# Patient Record
(demographics unavailable — no encounter records)

---

## 2025-03-06 NOTE — PHYSICAL EXAM
[Normal] : alert, oriented as age-appropriate, affect appropriate; no weakness, no facial asymmetry, moves all extremities normal gait-child older than 18 months [de-identified] : Well appearing. Active. walking about the room [de-identified] : Left leg - ankle area: mild swelling over dorsum and lateral malleolus. Point tenderness over distal end of fibula. FROM of active and passive movement of left joint. Gait: No limp. Able to walk on tippy toe. Some difficulty with heel walking.

## 2025-03-06 NOTE — HISTORY OF PRESENT ILLNESS
[FreeTextEntry2] : Hospital Course:  Discharge Date	01-Mar-2025  Admission Date	25-Feb-2025 22:43  Reason for Admission	r/o osteomyelitis  Hospital Course	  6yo M with no pmhx presenting with left ankle pain and swelling t/f from [facility] after fall 2 days ago. He reports Sun 2/23 night he fell out of bed (this happened in the past), did not tell parents until the morning. He woke up the following morning (Mon 2/24) limping saying "I broke my leg" but was able to bear some weight on it. He went to school and was able to walk home from the bus stop but continued to limp. Then this morning 2/25, he could not bear any weight and had decreased appetite, prompting visit to urgent care. At , he had fever 102F and erythema of the lower leg and were directed to go to [facility]. Denies rash, vomiting, diarrhea, headache, other joint pain. No recent travel or sick contacts. Parents did not check temp at home, he did not c/o feeling warm or chills prior to urgent care. Last PO 1130pm 2/25. No prior fractures or msk injury.   [facility]: WBC 20 w/ neutrophil pred. procal 0.19. CRP pending. X-ray negative Tylenol/Motrin, IV CTX, 1xNS bolus  ED: ortho said MRI and low concern septic jt, U/S prelim no fluid collection   Floor course (2/26 - 3/1):  Arrived to floor stable on mIVF and RA. MR c/f osteomyelitis with associated abscess vs underlying body neoplasm vs reactive vs septic arthritis. Found to have MSSA bacteremia on 2/27 from [facility] BCx. Started on Ancef on 2/27-3/1. IR drainage occurred on 2/27. BCx from 2/27 prior to antibiotics showed no growth at 24H. Patient does not need repeat negative blood culture. OR abscess culture collected 2/27 showed gram positive cocci in clusters, indicative of Staph. Joint effusion culture shows no growth to date. CRP downtrending. Patient is clinically stable, able to bear weight on leg, ambulating and afebrile prior to discharge on 3/1. He will need repeat MRI in 4-6 weeks.   On day of discharge, VS reviewed and remained wnl. Child continued to tolerate PO with adequate UOP. Child remained well-appearing, with no concerning findings noted on physical exam. Case and care plan d/w PMD. No additional recommendations noted. Care plan d/w caregivers who endorsed understanding. Anticipatory guidance and strict return precautions d/w caregivers in great detail. Child deemed stable for d/c home w/ recommended PMD f/u in 1-2 days of discharge.     Discharge Physical Exam:   Ext: Improved ROM of foot/ankle, less erythema and now receding from marked boarders, post op drainage site c/d/i, still with some tenderness but improved; all other joints full ROM   Discharge Medications	cephalexin 250 mg/5 mL oral liquid: 20 milliliter(s) orally every 8 hours     INTERVAL HX; 5TH March 2025 Acute BACTEREMIC OM with subperiosteal abscess of left fibula- MSSA. 2/27: s/p Left tibiotalar aspiration performed yielding small amount of clear fluid. Specimen collected for microbiology. Left fibular subperiosteal collection performed yielding appx 2cc purulent green cloudy viscous fluid. Specimen collected for microbiology. ,  Discharged home on March 2nd.  Doing well. Recovering. Last night vomting - 3 times y'day. And at 2 am in the morning. Partner sick too, with similar symptoms.  Swelling of the left foot is better, still with pain when touched. Walking well with no gait issues.  taking light foods, jello, apple juice, ice cream. This morning had just water. Kept it down.  No fevers.  No diarrhoea. No rash. No stomach pain. No urination issues.

## 2025-03-06 NOTE — CONSULT LETTER
[Dear  ___] : Dear  [unfilled], [Consult Letter:] : I had the pleasure of evaluating your patient, [unfilled]. [Please see my note below.] : Please see my note below. [Consult Closing:] : Thank you very much for allowing me to participate in the care of this patient.  If you have any questions, please do not hesitate to contact me. [Sincerely,] : Sincerely, [FreeTextEntry3] : Viviana Garcia MD Pediatric Infectious Diseases,  [facility]

## 2025-03-06 NOTE — HISTORY OF PRESENT ILLNESS
[FreeTextEntry2] : Hospital Course:  Discharge Date	01-Mar-2025  Admission Date	25-Feb-2025 22:43  Reason for Admission	r/o osteomyelitis  Hospital Course	  8yo M with no pmhx presenting with left ankle pain and swelling t/f from [facility] after fall 2 days ago. He reports Sun 2/23 night he fell out of bed (this happened in the past), did not tell parents until the morning. He woke up the following morning (Mon 2/24) limping saying "I broke my leg" but was able to bear some weight on it. He went to school and was able to walk home from the bus stop but continued to limp. Then this morning 2/25, he could not bear any weight and had decreased appetite, prompting visit to urgent care. At , he had fever 102F and erythema of the lower leg and were directed to go to [facility]. Denies rash, vomiting, diarrhea, headache, other joint pain. No recent travel or sick contacts. Parents did not check temp at home, he did not c/o feeling warm or chills prior to urgent care. Last PO 1130pm 2/25. No prior fractures or msk injury.   [facility]: WBC 20 w/ neutrophil pred. procal 0.19. CRP pending. X-ray negative Tylenol/Motrin, IV CTX, 1xNS bolus  ED: ortho said MRI and low concern septic jt, U/S prelim no fluid collection   Floor course (2/26 - 3/1):  Arrived to floor stable on mIVF and RA. MR c/f osteomyelitis with associated abscess vs underlying body neoplasm vs reactive vs septic arthritis. Found to have MSSA bacteremia on 2/27 from [facility] BCx. Started on Ancef on 2/27-3/1. IR drainage occurred on 2/27. BCx from 2/27 prior to antibiotics showed no growth at 24H. Patient does not need repeat negative blood culture. OR abscess culture collected 2/27 showed gram positive cocci in clusters, indicative of Staph. Joint effusion culture shows no growth to date. CRP downtrending. Patient is clinically stable, able to bear weight on leg, ambulating and afebrile prior to discharge on 3/1. He will need repeat MRI in 4-6 weeks.   On day of discharge, VS reviewed and remained wnl. Child continued to tolerate PO with adequate UOP. Child remained well-appearing, with no concerning findings noted on physical exam. Case and care plan d/w PMD. No additional recommendations noted. Care plan d/w caregivers who endorsed understanding. Anticipatory guidance and strict return precautions d/w caregivers in great detail. Child deemed stable for d/c home w/ recommended PMD f/u in 1-2 days of discharge.     Discharge Physical Exam:   Ext: Improved ROM of foot/ankle, less erythema and now receding from marked boarders, post op drainage site c/d/i, still with some tenderness but improved; all other joints full ROM   Discharge Medications	cephalexin 250 mg/5 mL oral liquid: 20 milliliter(s) orally every 8 hours     INTERVAL HX; 5TH March 2025 Acute BACTEREMIC OM with subperiosteal abscess of left fibula- MSSA. 2/27: s/p Left tibiotalar aspiration performed yielding small amount of clear fluid. Specimen collected for microbiology. Left fibular subperiosteal collection performed yielding appx 2cc purulent green cloudy viscous fluid. Specimen collected for microbiology. ,  Discharged home on March 2nd.  Doing well. Recovering. Last night vomting - 3 times y'day. And at 2 am in the morning. Partner sick too, with similar symptoms.  Swelling of the left foot is better, still with pain when touched. Walking well with no gait issues.  taking light foods, jello, apple juice, ice cream. This morning had just water. Kept it down.  No fevers.  No diarrhoea. No rash. No stomach pain. No urination issues.

## 2025-03-06 NOTE — CONSULT LETTER
[Dear  ___] : Dear  [unfilled], [Consult Letter:] : I had the pleasure of evaluating your patient, [unfilled]. [Please see my note below.] : Please see my note below. [Consult Closing:] : Thank you very much for allowing me to participate in the care of this patient.  If you have any questions, please do not hesitate to contact me. [Sincerely,] : Sincerely, [FreeTextEntry3] : Viviana Garcia MD Pediatric Infectious Diseases,  [facility]

## 2025-03-06 NOTE — REVIEW OF SYSTEMS
[Vomiting] : vomiting [Negative] : Cardiovascular [Negative] : Neurological [FreeTextEntry8] : vomiting resolving

## 2025-03-06 NOTE — PHYSICAL EXAM
[Normal] : alert, oriented as age-appropriate, affect appropriate; no weakness, no facial asymmetry, moves all extremities normal gait-child older than 18 months [de-identified] : Well appearing. Active. walking about the room [de-identified] : Left leg - ankle area: mild swelling over dorsum and lateral malleolus. Point tenderness over distal end of fibula. FROM of active and passive movement of left joint. Gait: No limp. Able to walk on tippy toe. Some difficulty with heel walking.